# Patient Record
Sex: MALE | Race: WHITE | NOT HISPANIC OR LATINO | Employment: OTHER | URBAN - METROPOLITAN AREA
[De-identification: names, ages, dates, MRNs, and addresses within clinical notes are randomized per-mention and may not be internally consistent; named-entity substitution may affect disease eponyms.]

---

## 2020-07-05 ENCOUNTER — VIRTUAL VISIT (OUTPATIENT)
Dept: FAMILY MEDICINE | Facility: OTHER | Age: 45
End: 2020-07-05

## 2020-07-07 NOTE — PROGRESS NOTES
"Date: 2020 09:31:41  Clinician: Kali Lee  Clinician NPI: [id number]  Patient: Antonio Wang  Patient : 1975  Patient Address: [address] [place] [id number]  Patient Phone: [phone]  Visit Protocol: URI  Patient Summary:  Antonio is a 44 year old ( : 1975 ) male who initiated a Visit for COVID-19 (Coronavirus) evaluation and screening. When asked the question \"Please sign me up to receive news, health information and promotions. \", Antonio responded \"No\".    When asked when his symptoms started, Antonio reported that he does not have any symptoms.   He denies having recent facial or sinus surgery in the past 60 days and taking antibiotic medication in the past month.    Pertinent COVID-19 (Coronavirus) information  In the past 14 days, Antonio has not worked in a congregate living setting.   He does not work or volunteer as healthcare worker or a  and does not work or volunteer in a healthcare facility.   Antonio also has not lived in a congregate living setting in the past 14 days. He does not live with a healthcare worker.   Antonio has had a close contact with a laboratory-confirmed COVID-19 patient in the last 14 days. Additional information about contact with COVID-19 (Coronavirus) patient as reported by the patient (free text): I was exposed 20 at a grad party in [facility]   Pertinent medical history  Antonio does not need a return to work/school note.   Weight: 145 lbs   Antonio smokes or uses smokeless tobacco.   Weight: 145 lbs    MEDICATIONS: No current medications, ALLERGIES: NKDA  Clinician Response:  Dear Antonio,      You don't here much about [place], [place].  My parents grew up in [place], [place].        Based on your exposure to.COVID-19 (Coronavirus), we would like to test you for this virus.  1. Please call [phone] to schedule your visit. Explain that you were referred by [facility] to have a COVID-19 test. Be ready to share your [facility] visit " ID number.  The following will serve as your written order for this COVID Test, ordered by me, for the indication of suspected COVID [Z20.828]: The test will be ordered in [facility], our electronic health record, after you are scheduled. It will show as ordered and authorized by Gabino Fitch MD.  Order: COVID-19 (Coronavirus) PCR for ASYMPTOMATIC EXPOSURE testing from [facility].  If you know you have had close contact with someone who tested positive, you should be quarantined for 14 days after this exposure. You should stay in quarantine for the14 days even if the covid test is negative, the optimal time to test after exposure is 5-7 days from the exposure  Quarantine means   What should I do?  For safety, it's very important to follow these rules. Do this for 14 days after the date you were last exposed to the virus..  Stay home and away from others. Don't go to school or anywhere else. Generally quarantine means staying home for work but there are some exceptions to this. Please contact your workplace.   No hugging, kissing or shaking hands.  Don't let anyone visit.  Cover your mouth and nose with a mask, tissue or washcloth to avoid spreading germs.  Wash your hands and face often. Use soap and water.  What are the symptoms of COVID-19?  The most common symptoms are cough, fever and trouble breathing. Less common symptoms include headache, body aches, fatigue (feeling very tired), chills, sore throat, stuffy or runny nose, diarrhea (loose poop), loss of taste or smell, belly pain, and nausea or vomiting (feeling sick to your stomach or throwing up).  After 14 days, if you have still don't have symptoms, you likely don't have this virus.  If you develop symptoms, follow these guidelines.  If you're normally healthy: Please start another [facility] visit to report your symptoms. Go to [email or web address].  If you have a serious health problem (like cancer, heart failure, an organ transplant or kidney disease): Call your specialty  clinic. Let them know that you might have COVID-19.  2. When it's time for your COVID test:  Stay at least 6 feet away from others. (If someone will drive you to your test, stay in the backseat, as far away from the  as you can.)  Cover your mouth and nose with a mask, tissue or washcloth.  Go straight to the testing site. Don't make any stops on the way there or back.  Please note  Caregivers in these groups are at risk for severe illness due to COVID-19:  o People 65 years and older  o People who live in a nursing home or long-term care facility  o People with chronic disease (lung, heart, cancer, diabetes, kidney, liver, immunologic)  o People who have a weakened immune system, including those who:  Are in cancer treatment  Take medicine that weakens the immune system, such as corticosteroids  Had a bone marrow or organ transplant  Have an immune deficiency  Have poorly controlled HIV or AIDS  Are obese (body mass index of 40 or higher)  Smoke regularly  Where can I get more information?  [facility] -- About COVID-19: [email or web address]  CDC -- What to Do If You're Sick: www.cdc.gov/coronavirus/2019-ncov/about/steps-when-sick.html  [facility] -- Ending Home Isolation: www.cdc.gov/coronavirus/2019-ncov/hcp/disposition-in-home-patients.html  [facility] -- Caring for Someone: www.cdc.gov/coronavirus/2019-ncov/if-you-are-sick/care-for-someone.html  [facility] -- Interim Guidance for Hospital Discharge to Home: www.health.[facility].[place]/diseases/coronavirus/hcp/hospdischarge.pdf  [facility] clinical trials (COVID-19 research studies): clinicalaffairs.[facility]-clinical-trials  Below are the COVID-19 hotlines at the [place] [facility] ([facility]). Interpreters are available.  For health questions: Call 287-472-4607 or 1-660.355.7450 (7 a.m. to 7 p.m.)  For questions about schools and childcare: Call 410-328-7090 or 1-740.814.4808 (7 a.m. to 7 p.m.)    Diagnosis: Cough  Diagnosis ICD: R05

## 2023-07-06 ENCOUNTER — OFFICE VISIT (OUTPATIENT)
Dept: CHIROPRACTIC MEDICINE | Facility: OTHER | Age: 48
End: 2023-07-06
Attending: CHIROPRACTOR

## 2023-07-06 DIAGNOSIS — M99.03 SEGMENTAL AND SOMATIC DYSFUNCTION OF LUMBAR REGION: Primary | ICD-10-CM

## 2023-07-06 DIAGNOSIS — M54.50 ACUTE RIGHT-SIDED LOW BACK PAIN WITHOUT SCIATICA: ICD-10-CM

## 2023-07-06 DIAGNOSIS — M99.02 SEGMENTAL AND SOMATIC DYSFUNCTION OF THORACIC REGION: ICD-10-CM

## 2023-07-06 DIAGNOSIS — M99.01 SEGMENTAL AND SOMATIC DYSFUNCTION OF CERVICAL REGION: ICD-10-CM

## 2023-07-06 PROCEDURE — 98941 CHIROPRACT MANJ 3-4 REGIONS: CPT | Mod: AT | Performed by: CHIROPRACTOR

## 2023-07-06 NOTE — PROGRESS NOTES
Subjective Finding:    Chief compalint: Patient presents with:  Back Pain: Tightness in low back and neck region    , Pain Scale: 5/10, Intensity: sharp, Duration: 2 months, Radiating: no.    Date of injury:     Activities that the pain restricts:   Home/household/hobbies/social activities: Yes.  Work duties: Yes.  Sleep: Yes.  Makes symptoms better: rest.  Makes symptoms worse: activity.  Have you seen anyone else for the symptoms? No.  Work related: No.  Automobile related injury: No.    Objective and Assessment:    Posture Analysis:   High shoulder: .  Head tilt: .  High iliac crest: right.  Head carriage: neutral.  Thoracic Kyphosis: neutral.  Lumbar Lordosis: forward.    Lumbar Range of Motion: extension decreased and right lateral flexion decreased.  Cervical Range of Motion: extension decreased.  Thoracic Range of Motion: extension decreased.  Extremity Range of Motion: .    Palpation:   Quad lumb: bilateral, referred pain: no  Lev scapulae: sharp pain, referred pain: no    Segmental dysfunction pre-treatment and treatment area: C2, T6, T7, L4 and L5.    Assessment post-treatment:  Cervical: ROM increased.  Thoracic: ROM increased.  Lumbar: ROM increased.    Comments: .      Complicating Factors: .    Procedure(s):  CMT:  21604 Chiropractic manipulative treatment 3-4 regions performed   Cervical: Diversified, See above for level, Supine, Thoracic: Diversified, See above for level, Prone and Lumbar: Diversified, See above for level, Side posture    Modalities:  None performed this visit    Therapeutic procedures:  None    Plan:  Treatment plan: 2 times per week for 1 weeks.  Instructed patient: stretch as instructed at visit and walk 10 minutes.  Short term goals: increase ROM.  Long term goals: restore normal function.  Prognosis: excellent.

## 2023-12-13 ENCOUNTER — HOSPITAL ENCOUNTER (EMERGENCY)
Facility: HOSPITAL | Age: 48
Discharge: HOME | End: 2023-12-13
Attending: INTERNAL MEDICINE | Admitting: INTERNAL MEDICINE

## 2023-12-13 ENCOUNTER — APPOINTMENT (OUTPATIENT)
Dept: GENERAL RADIOLOGY | Facility: HOSPITAL | Age: 48
End: 2023-12-13
Attending: INTERNAL MEDICINE

## 2023-12-13 VITALS
DIASTOLIC BLOOD PRESSURE: 88 MMHG | SYSTOLIC BLOOD PRESSURE: 143 MMHG | OXYGEN SATURATION: 98 % | TEMPERATURE: 98.7 F | HEART RATE: 102 BPM | RESPIRATION RATE: 18 BRPM

## 2023-12-13 DIAGNOSIS — S61.012A: ICD-10-CM

## 2023-12-13 DIAGNOSIS — L08.9: ICD-10-CM

## 2023-12-13 PROCEDURE — 87070 CULTURE OTHR SPECIMN AEROBIC: CPT | Performed by: INTERNAL MEDICINE

## 2023-12-13 PROCEDURE — 99284 EMERGENCY DEPT VISIT MOD MDM: CPT | Performed by: INTERNAL MEDICINE

## 2023-12-13 PROCEDURE — 99284 EMERGENCY DEPT VISIT MOD MDM: CPT

## 2023-12-13 PROCEDURE — 73140 X-RAY EXAM OF FINGER(S): CPT | Mod: LT

## 2023-12-13 PROCEDURE — 87077 CULTURE AEROBIC IDENTIFY: CPT | Performed by: INTERNAL MEDICINE

## 2023-12-13 PROCEDURE — 250N000013 HC RX MED GY IP 250 OP 250 PS 637: Performed by: INTERNAL MEDICINE

## 2023-12-13 RX ORDER — CLINDAMYCIN HCL 150 MG
450 CAPSULE ORAL 3 TIMES DAILY
Qty: 63 CAPSULE | Refills: 0 | Status: SHIPPED | OUTPATIENT
Start: 2023-12-13 | End: 2023-12-20

## 2023-12-13 RX ORDER — CLINDAMYCIN HCL 150 MG
450 CAPSULE ORAL ONCE
Status: COMPLETED | OUTPATIENT
Start: 2023-12-13 | End: 2023-12-13

## 2023-12-13 RX ADMIN — CLINDAMYCIN HYDROCHLORIDE 450 MG: 150 CAPSULE ORAL at 20:21

## 2023-12-14 ASSESSMENT — ENCOUNTER SYMPTOMS
SHORTNESS OF BREATH: 0
COUGH: 0
FEVER: 0
ABDOMINAL PAIN: 0

## 2023-12-14 NOTE — ED NOTES
"Patient ambulatory to ED room 7. Patient states about 1 week ago he reached into his pocket and stuck left thumb with a fish hook. A few days after that, patient noted some swelling and discomfort to thumb. Patient states that he \"poked\" a hole into thumb to get \"relieve pressure\" and get some \"pus out\". Patient has open area with white surrounding (see photo).   "

## 2023-12-14 NOTE — ED PROVIDER NOTES
History     Chief Complaint   Patient presents with    Thumb Discomfort     The history is provided by the patient.   Hand Injury  Location:  Finger  Finger location:  L thumb  Injury: yes    Time since incident:  10 days  Pain details:     Quality:  Aching    Severity:  Mild    Onset quality:  Gradual    Duration:  10 days    Timing:  Constant    Progression:  Unchanged  Tetanus status:  Unknown  Associated symptoms: no fever        Allergies:  Allergies   Allergen Reactions    Amoxicillin      As a child        Problem List:    There are no problems to display for this patient.       Past Medical History:    No past medical history on file.    Past Surgical History:    No past surgical history on file.    Family History:    No family history on file.    Social History:  Marital Status:  Single [1]        Medications:    clindamycin (CLEOCIN) 150 MG capsule          Review of Systems   Constitutional:  Negative for fever.   Respiratory:  Negative for cough and shortness of breath.    Cardiovascular:  Negative for chest pain.   Gastrointestinal:  Negative for abdominal pain.   Skin:  Negative for rash.   All other systems reviewed and are negative.      Physical Exam   BP: 143/88  Pulse: 102  Temp: 98.7  F (37.1  C)  Resp: 18  SpO2: 98 %      Physical Exam  Constitutional:       General: He is not in acute distress.     Appearance: Normal appearance. He is not toxic-appearing.   HENT:      Head: Atraumatic.   Eyes:      General: No scleral icterus.     Conjunctiva/sclera: Conjunctivae normal.   Cardiovascular:      Rate and Rhythm: Normal rate.      Heart sounds: Normal heart sounds.   Pulmonary:      Effort: Pulmonary effort is normal. No respiratory distress.      Breath sounds: Normal breath sounds.   Abdominal:      Palpations: Abdomen is soft.      Tenderness: There is no abdominal tenderness.   Musculoskeletal:         General: No deformity.      Cervical back: Neck supple.      Comments: Left thumb :  "ventral aspect of distal phalanx open wound , swollen, pus is coming out with squeezing it  ROM of 1 MCP and PIP joint of left thumb is normal   Left hand : no swelling, no redness and no tenderness   Skin:     General: Skin is warm.   Neurological:      Mental Status: He is alert.         ED Course                 Procedures                Results for orders placed or performed during the hospital encounter of 12/13/23 (from the past 24 hour(s))   Fingers XR, 2-3 views, left    Narrative    PROCEDURE:  XR FINGER LEFT G/E 2 VIEWS    HISTORY: pain, swelling    COMPARISON: No relevant priors available for comparison    TECHNIQUE:  XR FINGER LEFT 2 VIEWS    FINDINGS:   No acute fracture or dislocation is identified. No suspicious osseous  lesion. The joint spaces are preserved. Ulnar subluxation of the third  distal phalanx.    No foreign body or subcutaneous emphysema.        Impression    IMPRESSION:   No acute osseous abnormality.    UNRULY SILVA MD         SYSTEM ID:  [id number]       Medications   clindamycin (CLEOCIN) capsule 450 mg (has no administration in time range)       Assessments & Plan (with Medical Decision Making)   Left thumb local infection with open wound  Wound culture sent   Clinamycin started  I advised to follow with hand clininc/ wound clinic   Return to ER  pain increasing, redness spreading hand , fever or any other unusual symptoms  He understood and agreed.     Pt refused tetnus shot , stated \" I do not believe in vaccination\"    I have reviewed the nursing notes.    I have reviewed the findings, diagnosis, plan and need for follow up with the patient.          New Prescriptions    CLINDAMYCIN (CLEOCIN) 150 MG CAPSULE    Take 3 capsules (450 mg) by mouth 3 times daily for 7 days       Final diagnoses:   Laceration of thumb with infection, left, initial encounter       12/13/2023   [facility]       Omar Viera MD  12/14/23 0526    "

## 2023-12-14 NOTE — ED NOTES
"Patient provided written and verbal discharge instructions and patient verbalizes understanding. Patient declined tdap to both this writer and Dr. Viera, stating \"I'm good on that\". Patient advised that prescription was e-scribed to [facility].   "

## 2023-12-14 NOTE — DISCHARGE INSTRUCTIONS
[facility]  [address] [address]  [phone]

## 2023-12-14 NOTE — ED TRIAGE NOTES
"\"Fish hook got stuck in my left thumb about 7-10 days ago.  Having pain in left thumb and hand.  Swelling present in left thumb.\"          "

## 2023-12-16 LAB
BACTERIA ABSC ANAEROBE+AEROBE CULT: ABNORMAL
GRAM STAIN RESULT: ABNORMAL
GRAM STAIN RESULT: ABNORMAL

## 2024-04-19 ENCOUNTER — HOSPITAL ENCOUNTER (EMERGENCY)
Facility: HOSPITAL | Age: 49
Discharge: LEFT BEFORE DISCHARGE | End: 2024-04-19

## 2024-04-19 VITALS
SYSTOLIC BLOOD PRESSURE: 155 MMHG | TEMPERATURE: 97.1 F | HEART RATE: 87 BPM | RESPIRATION RATE: 16 BRPM | WEIGHT: 135.69 LBS | DIASTOLIC BLOOD PRESSURE: 104 MMHG | OXYGEN SATURATION: 99 %

## 2024-04-19 ASSESSMENT — COLUMBIA-SUICIDE SEVERITY RATING SCALE - C-SSRS
1. IN THE PAST MONTH, HAVE YOU WISHED YOU WERE DEAD OR WISHED YOU COULD GO TO SLEEP AND NOT WAKE UP?: NO
6. HAVE YOU EVER DONE ANYTHING, STARTED TO DO ANYTHING, OR PREPARED TO DO ANYTHING TO END YOUR LIFE?: NO
2. HAVE YOU ACTUALLY HAD ANY THOUGHTS OF KILLING YOURSELF IN THE PAST MONTH?: NO

## 2024-04-19 NOTE — ED TRIAGE NOTES
Admitting desk reports patient informed them he wanted to be seen in urgent care.     Triage Assessment (Adult)       Row Name 04/19/24 1548          Triage Assessment    [facility] WDL

## 2024-04-19 NOTE — ED TRIAGE NOTES
"Reports last weekend he noted right lateral leg pain and noticed a red area. Since it has worsened and become swollen and has a black scab on the center. He did not see the spider but \"spent all day researching it and I could bet that is what it is\". Denies fever/ chills, N/V/D.         "

## 2024-04-20 ENCOUNTER — HOSPITAL ENCOUNTER (EMERGENCY)
Facility: HOSPITAL | Age: 49
Discharge: HOME | End: 2024-04-20
Attending: NURSE PRACTITIONER | Admitting: NURSE PRACTITIONER

## 2024-04-20 VITALS
DIASTOLIC BLOOD PRESSURE: 83 MMHG | RESPIRATION RATE: 19 BRPM | HEART RATE: 72 BPM | TEMPERATURE: 97.6 F | SYSTOLIC BLOOD PRESSURE: 128 MMHG | OXYGEN SATURATION: 100 %

## 2024-04-20 DIAGNOSIS — L08.9 INFECTED WOUND: ICD-10-CM

## 2024-04-20 DIAGNOSIS — T14.8XXA INFECTED WOUND: ICD-10-CM

## 2024-04-20 PROCEDURE — 10060 I&D ABSCESS SIMPLE/SINGLE: CPT

## 2024-04-20 PROCEDURE — 87070 CULTURE OTHR SPECIMN AEROBIC: CPT | Performed by: NURSE PRACTITIONER

## 2024-04-20 PROCEDURE — 999N000104 HC STATISTIC NO CHARGE

## 2024-04-20 PROCEDURE — 250N000009 HC RX 250: Performed by: NURSE PRACTITIONER

## 2024-04-20 PROCEDURE — 10060 I&D ABSCESS SIMPLE/SINGLE: CPT | Performed by: NURSE PRACTITIONER

## 2024-04-20 RX ORDER — SULFAMETHOXAZOLE/TRIMETHOPRIM 800-160 MG
1 TABLET ORAL 2 TIMES DAILY
Qty: 14 TABLET | Refills: 0 | Status: SHIPPED | OUTPATIENT
Start: 2024-04-20 | End: 2024-04-27

## 2024-04-20 RX ADMIN — LIDOCAINE HYDROCHLORIDE AND EPINEPHRINE 4 ML: 20; 10 INJECTION, SOLUTION INFILTRATION; PERINEURAL at 13:53

## 2024-04-20 ASSESSMENT — ENCOUNTER SYMPTOMS
CHILLS: 0
COLOR CHANGE: 1
FEVER: 0
NAUSEA: 0
NUMBNESS: 0
ACTIVITY CHANGE: 1
VOMITING: 0
WOUND: 1

## 2024-04-20 ASSESSMENT — COLUMBIA-SUICIDE SEVERITY RATING SCALE - C-SSRS
2. HAVE YOU ACTUALLY HAD ANY THOUGHTS OF KILLING YOURSELF IN THE PAST MONTH?: NO
1. IN THE PAST MONTH, HAVE YOU WISHED YOU WERE DEAD OR WISHED YOU COULD GO TO SLEEP AND NOT WAKE UP?: NO
6. HAVE YOU EVER DONE ANYTHING, STARTED TO DO ANYTHING, OR PREPARED TO DO ANYTHING TO END YOUR LIFE?: NO

## 2024-04-20 ASSESSMENT — ACTIVITIES OF DAILY LIVING (ADL): ADLS_ACUITY_SCORE: 35

## 2024-04-20 NOTE — ED TRIAGE NOTES
Pt presents with c/o insect bite    State that he thinks he was bite by  spider.    Right leg, next to his knee.   Noticed a week ago.   Has tried squeezing it     Black middle, can see two spots of white under the skin also

## 2024-04-20 NOTE — ED PROVIDER NOTES
History     Chief Complaint   Patient presents with    Wound Check     HPI  Antonio Wang is a 48 year old male who presents what he believes is a recluse spider bite on his right lateral knee that started 7 to 9 days ago.  The wound has progressively worsened to the point it has  a black center, is swollen, and surrounded by redness.  Has a second bite area right, medial, lower leg. No OTC medications have been taken or home interventions applied.  Smoker.  Denies fevers, chills, nausea, vomiting, and numbness and tingling in his right leg.      Allergies:  Allergies   Allergen Reactions    Amoxicillin      As a child        Problem List:    There are no problems to display for this patient.       Past Medical History:    History reviewed. No pertinent past medical history.    Past Surgical History:    History reviewed. No pertinent surgical history.    Family History:    History reviewed. No pertinent family history.    Social History:  Marital Status:  Single [1]        Medications:    sulfamethoxazole-trimethoprim (BACTRIM DS) 800-160 MG tablet          Review of Systems   Constitutional:  Positive for activity change. Negative for chills and fever.   Gastrointestinal:  Negative for nausea and vomiting.   Skin:  Positive for color change (redness right lateral knee) and wound (right lateral knee).   Neurological:  Negative for numbness.       Physical Exam   BP: 128/83  Pulse: 72  Temp: 97.6  F (36.4  C)  Resp: 19  SpO2: 100 %      Physical Exam  Vitals and nursing note reviewed.   Constitutional:       General: He is in acute distress (mild).      Appearance: He is underweight.   Cardiovascular:      Rate and Rhythm: Normal rate.   Pulmonary:      Effort: Pulmonary effort is normal.   Musculoskeletal:         General: Swelling and tenderness present.        Legs:    Skin:     General: Skin is warm and dry.      Capillary Refill: Capillary refill takes less than 2 seconds.      Findings: Erythema present. No  bruising.   Neurological:      Mental Status: He is alert and oriented to person, place, and time.   Psychiatric:         Behavior: Behavior normal.         ED Course        [facility]    PROCEDURE: -Incision/Drainage    Date/Time: 4/20/2024 2:22 PM    Performed by: Bhavana Davila CNP  Authorized by: Bhavana Davila CNP    Risks, benefits and alternatives discussed.      LOCATION:      Type:  Abscess    Size:  1 cm    Location:  Lower extremity    Lower extremity location:  Knee    Knee location:  R knee    PRE-PROCEDURE DETAILS:     Skin preparation:  Chloraprep    PROCEDURE TYPE:     Complexity:  Simple    ANESTHESIA (see MAR for exact dosages):     Anesthesia method:  Local infiltration    Local anesthetic:  Lidocaine 2% WITH epi (5 ml)    PROCEDURE DETAILS:     Needle aspiration: no      Incision types:  Stab incision    Incision depth:  Dermal    Scalpel blade:  11    Wound management:  Debrided and irrigated with saline    Drainage:  Bloody and purulent    Drainage amount:  Scant    Wound treatment:  Wound left open    Packing materials:  None    PROCEDURE  Describe Procedure: Consent obtained. Time out completed. Right knee cleaned with chloraprep. 2 mm eschar tissue removed along with small amount of slough debrided and removed with #11 blade from 1 cm infected wound. Wound Culture obtained. Irrigated with 20 ml NS. Triple antibiotic and dry dressing placed.         Patient Tolerance:  Patient tolerated the procedure well with no immediate complications                    No results found for this or any previous visit (from the past 24 hour(s)).    Medications   lidocaine 2%-EPINEPHrine 1:100,000 injection 4 mL (4 mLs Other $Given 4/20/24 1353)       Assessments & Plan (with Medical Decision Making)     I have reviewed the nursing notes.    I have reviewed the findings, diagnosis, plan and need for follow up with the patient.  (T14.8XXA,  L08.9) Infected wound  Comment: 48 year  old male who presents what he believes is a recluse spider bite on his right lateral knee that started 7 to 9 days ago.  The wound has progressively worsened to the point it has  a black center, is swollen, and surrounded by redness.  Has a second bite area right, medial, lower leg. No OTC medications have been taken or home interventions applied.  Smoker.  Denies fevers, chills, nausea, vomiting, and numbness and tingling in his right leg.      MDM: 1 cm eschar tissue surrounded by 1.2 cm region of erythema. Very tender to touch Very tender to touch. See media  See procedure note    Triple antibiotic and dressing applied per LPN    Plan: Apply bacitracin and do twice daily dressing changes for the next 48 to 72 hours. You may shower but do not saturate the wound. If you have increased pain, redness at wound site, fevers, or abnormal drainage (purulent/pus) you need to see your primary care provider or return to Urgent Care/ER immediately. Acetaminophen/tylenol  or ibuprofen for pain. Complete all antibiotics even if feeling better.  Take antibiotics with food unless instructed otherwise. Yogurt or probiotics may help decrease stomach upset and diarrhea.    These discharge instructions and medications were reviewed with him and understanding verbalized.    This document was prepared using a combination of typing and voice generated software.  While every attempt was made for accuracy, spelling and grammatical errors may exist.    Discharge Medication List as of 4/20/2024  2:17 PM        START taking these medications    Details   sulfamethoxazole-trimethoprim (BACTRIM DS) 800-160 MG tablet Take 1 tablet by mouth 2 times daily for 7 days, Disp-14 tablet, R-0, E-Prescribe             Final diagnoses:   Infected wound       4/20/2024   [facility]         Bhavana Davila, CNP  04/20/24 4712

## 2024-04-20 NOTE — DISCHARGE INSTRUCTIONS
Apply bacitracin and do twice daily dressing changes for the next 48 to 72 hours. You may shower but do not saturate the wound. If you have increased pain, redness at wound site, fevers, or abnormal drainage (purulent/pus) you need to see your primary care provider or return to Urgent Care/ER immediately. Acetaminophen/tylenol  or ibuprofen for pain. Complete all antibiotics even if feeling better.  Take antibiotics with food unless instructed otherwise. Yogurt or probiotics may help decrease stomach upset and diarrhea.

## 2024-04-22 LAB
BACTERIA ABSC ANAEROBE+AEROBE CULT: ABNORMAL
GRAM STAIN RESULT: ABNORMAL

## 2024-11-20 ENCOUNTER — APPOINTMENT (OUTPATIENT)
Dept: GENERAL RADIOLOGY | Facility: HOSPITAL | Age: 49
End: 2024-11-20
Attending: NURSE PRACTITIONER

## 2024-11-20 ENCOUNTER — HOSPITAL ENCOUNTER (EMERGENCY)
Facility: HOSPITAL | Age: 49
Discharge: HOME | End: 2024-11-20
Attending: NURSE PRACTITIONER

## 2024-11-20 VITALS
TEMPERATURE: 98.5 F | OXYGEN SATURATION: 97 % | RESPIRATION RATE: 20 BRPM | DIASTOLIC BLOOD PRESSURE: 89 MMHG | HEIGHT: 69 IN | SYSTOLIC BLOOD PRESSURE: 143 MMHG | BODY MASS INDEX: 21.48 KG/M2 | WEIGHT: 145 LBS | HEART RATE: 105 BPM

## 2024-11-20 DIAGNOSIS — T14.8XXA OPEN WOUND: ICD-10-CM

## 2024-11-20 DIAGNOSIS — M70.52 INFRAPATELLAR BURSITIS OF LEFT KNEE: Primary | ICD-10-CM

## 2024-11-20 DIAGNOSIS — L03.116 CELLULITIS OF LEFT LOWER EXTREMITY: ICD-10-CM

## 2024-11-20 LAB
BASOPHILS # BLD AUTO: 0 10E3/UL (ref 0–0.2)
BASOPHILS NFR BLD AUTO: 0 %
CRP SERPL-MCNC: 132.02 MG/L
EOSINOPHIL # BLD AUTO: 0.1 10E3/UL (ref 0–0.7)
EOSINOPHIL NFR BLD AUTO: 1 %
ERYTHROCYTE [DISTWIDTH] IN BLOOD BY AUTOMATED COUNT: 14 % (ref 10–15)
ERYTHROCYTE [SEDIMENTATION RATE] IN BLOOD BY WESTERGREN METHOD: 17 MM/HR (ref 0–15)
HCT VFR BLD AUTO: 42.8 % (ref 40–53)
HGB BLD-MCNC: 14.6 G/DL (ref 13.3–17.7)
HOLD SPECIMEN: NORMAL
IMM GRANULOCYTES # BLD: 0.1 10E3/UL
IMM GRANULOCYTES NFR BLD: 0 %
LYMPHOCYTES # BLD AUTO: 1.9 10E3/UL (ref 0.8–5.3)
LYMPHOCYTES NFR BLD AUTO: 13 %
MCH RBC QN AUTO: 28.3 PG (ref 26.5–33)
MCHC RBC AUTO-ENTMCNC: 34.1 G/DL (ref 31.5–36.5)
MCV RBC AUTO: 83 FL (ref 78–100)
MONOCYTES # BLD AUTO: 1.8 10E3/UL (ref 0–1.3)
MONOCYTES NFR BLD AUTO: 12 %
NEUTROPHILS # BLD AUTO: 11.2 10E3/UL (ref 1.6–8.3)
NEUTROPHILS NFR BLD AUTO: 74 %
NRBC # BLD AUTO: 0 10E3/UL
NRBC BLD AUTO-RTO: 0 /100
PLAT MORPH BLD: NORMAL
PLATELET # BLD AUTO: 449 10E3/UL (ref 150–450)
RBC # BLD AUTO: 5.16 10E6/UL (ref 4.4–5.9)
RBC MORPH BLD: NORMAL
WBC # BLD AUTO: 15.1 10E3/UL (ref 4–11)

## 2024-11-20 PROCEDURE — 87205 SMEAR GRAM STAIN: CPT | Performed by: NURSE PRACTITIONER

## 2024-11-20 PROCEDURE — 73562 X-RAY EXAM OF KNEE 3: CPT | Mod: LT

## 2024-11-20 PROCEDURE — 86140 C-REACTIVE PROTEIN: CPT | Performed by: NURSE PRACTITIONER

## 2024-11-20 PROCEDURE — G0463 HOSPITAL OUTPT CLINIC VISIT: HCPCS

## 2024-11-20 PROCEDURE — 36415 COLL VENOUS BLD VENIPUNCTURE: CPT | Performed by: NURSE PRACTITIONER

## 2024-11-20 PROCEDURE — 87070 CULTURE OTHR SPECIMN AEROBIC: CPT | Performed by: NURSE PRACTITIONER

## 2024-11-20 PROCEDURE — 85025 COMPLETE CBC W/AUTO DIFF WBC: CPT | Performed by: NURSE PRACTITIONER

## 2024-11-20 PROCEDURE — 99214 OFFICE O/P EST MOD 30 MIN: CPT | Performed by: NURSE PRACTITIONER

## 2024-11-20 PROCEDURE — 85652 RBC SED RATE AUTOMATED: CPT | Performed by: NURSE PRACTITIONER

## 2024-11-20 PROCEDURE — 87186 SC STD MICRODIL/AGAR DIL: CPT | Performed by: NURSE PRACTITIONER

## 2024-11-20 RX ORDER — CLINDAMYCIN HYDROCHLORIDE 150 MG/1
450 CAPSULE ORAL 3 TIMES DAILY
Qty: 63 CAPSULE | Refills: 0 | Status: SHIPPED | OUTPATIENT
Start: 2024-11-20 | End: 2024-11-27

## 2024-11-20 ASSESSMENT — ENCOUNTER SYMPTOMS
JOINT SWELLING: 1
MYALGIAS: 1
FEVER: 0
CHILLS: 0
WOUND: 1
COLOR CHANGE: 1

## 2024-11-20 ASSESSMENT — ACTIVITIES OF DAILY LIVING (ADL)
ADLS_ACUITY_SCORE: 0
ADLS_ACUITY_SCORE: 0

## 2024-11-20 NOTE — ED PROVIDER NOTES
"  History     Chief Complaint   Patient presents with     Wound Check     Leg Pain     HPI  Antonio Wang is a 49 year old male who presents to urgent care with concerns of infection to his left knee.  He has had pain to his knee for about 5 days.  This is accompanied by swelling, redness and a wound.  He tells me that the wound initially started off as a little pimple that he eventually popped and noted some white discharge from it.  He notes that every time that he does squeeze this area he tends to get a discharge out of it.  Pain is going down his left leg.  Reports that he does kneel down at work several times and that may have started the pimple.  No fever or chills.  He is having a hard time standing for a long time due to the pain to his knee.  Reports some decreased range of motion.    Allergies:  Allergies   Allergen Reactions     Amoxicillin      As a child        Problem List:    There are no active problems to display for this patient.       Past Medical History:    History reviewed. No pertinent past medical history.    Past Surgical History:    History reviewed. No pertinent surgical history.    Family History:    History reviewed. No pertinent family history.    Social History:  Marital Status:  Single [1]        Medications:    clindamycin (CLEOCIN) 150 MG capsule          Review of Systems   Constitutional:  Negative for chills and fever.   Musculoskeletal:  Positive for gait problem, joint swelling and myalgias.   Skin:  Positive for color change and wound.   All other systems reviewed and are negative.      Physical Exam   BP: 143/89  Pulse: 105  Temp: 98.5  F (36.9  C)  Resp: 20  Height: 175.3 cm (5' 9\")  Weight: 65.8 kg (145 lb)  SpO2: 97 %      Physical Exam  Vitals and nursing note reviewed.   Constitutional:       Appearance: Normal appearance. He is not ill-appearing or toxic-appearing.   HENT:      Head: Atraumatic.   Eyes:      Pupils: Pupils are equal, round, and reactive to light. "   Cardiovascular:      Rate and Rhythm: Normal rate.   Pulmonary:      Effort: Pulmonary effort is normal. No respiratory distress.   Musculoskeletal:         General: Swelling and tenderness present. No deformity.      Cervical back: Neck supple.      Right lower leg: No edema.      Left lower leg: No edema.      Comments: Redness and warmth to medial left knee.  Swelling appreciated to entire left knee joint.  Superficial open wound measuring 1 cm x 0.5 cm inferior to knee.  No active drainage to this area.  Tenderness to anterior left knee joint.  Knee extension about 180 degrees.  He can flex his knee to about 45 degrees but does note increasing pain with this.   Skin:     General: Skin is warm and dry.      Capillary Refill: Capillary refill takes less than 2 seconds.      Findings: Erythema present.   Neurological:      Mental Status: He is alert and oriented to person, place, and time.         ED Course     ED Course as of 11/20/24 1508   Wed Nov 20, 2024   1105 Consult to orthopedic surgeon, Dr. Levy.  He recommends knee aspiration and collecting for cultures and crystals of the knee.  Patient should be started on antibiotics with recommendation for clindamycin however if is appropriate.  Dr. Levy is also concerned about gout.     Procedures            Results for orders placed or performed during the hospital encounter of 11/20/24 (from the past 24 hours)   XR Knee Left 3 Views    Narrative    Exam: XR KNEE LEFT 3 VIEWS     History:Male, age 49 years, pain and swelling to knee; superficial  open wound superior to knee cap; erythema to medial knee    Comparison:  No relevant prior imaging.    Technique: Three views are submitted.    Findings: Bones are normally mineralized. No evidence of acute or  subacute fracture.  No evidence of dislocation.           Impression    Impression:  No evidence of acute or subacute bony abnormality.     Localized swelling in the anterior aspect of the knee near the  tibial  tubercle, suggesting the possibility of superficial infrapatellar  bursitis. MRI would better characterize.    KEN MASTERS MD         SYSTEM ID:  [id number]   CBC with platelets differential    Narrative    The following orders were created for panel order CBC with platelets differential.  Procedure                               Abnormality         Status                     ---------                               -----------         ------                     CBC with platelets and d...[[id number]]  Abnormal            Final result               RBC and Platelet Morphology[[id number]]                      Final result                 Please view results for these tests on the individual orders.   CRP inflammation   Result Value Ref Range    CRP Inflammation 132.02 (H) <5.00 mg/L   Erythrocyte sedimentation rate auto   Result Value Ref Range    Erythrocyte Sedimentation Rate 17 (H) 0 - 15 mm/hr   CBC with platelets and differential   Result Value Ref Range    WBC Count 15.1 (H) 4.0 - 11.0 10e3/uL    RBC Count 5.16 4.40 - 5.90 10e6/uL    Hemoglobin 14.6 13.3 - 17.7 g/dL    Hematocrit 42.8 40.0 - 53.0 %    MCV 83 78 - 100 fL    MCH 28.3 26.5 - 33.0 pg    MCHC 34.1 31.5 - 36.5 g/dL    RDW 14.0 10.0 - 15.0 %    Platelet Count 449 150 - 450 10e3/uL    % Neutrophils 74 %    % Lymphocytes 13 %    % Monocytes 12 %    % Eosinophils 1 %    % Basophils 0 %    % Immature Granulocytes 0 %    NRBCs per 100 WBC 0 <1 /100    Absolute Neutrophils 11.2 (H) 1.6 - 8.3 10e3/uL    Absolute Lymphocytes 1.9 0.8 - 5.3 10e3/uL    Absolute Monocytes 1.8 (H) 0.0 - 1.3 10e3/uL    Absolute Eosinophils 0.1 0.0 - 0.7 10e3/uL    Absolute Basophils 0.0 0.0 - 0.2 10e3/uL    Absolute Immature Granulocytes 0.1 <=0.4 10e3/uL    Absolute NRBCs 0.0 10e3/uL   Extra Tube    Narrative    The following orders were created for panel order Extra Tube.  Procedure                               Abnormality         Status                     ---------                                -----------         ------                     Extra Heparinized Syringe[[id number]]                        Final result                 Please view results for these tests on the individual orders.   Extra Heparinized Syringe   Result Value Ref Range    Hold Specimen JI    RBC and Platelet Morphology   Result Value Ref Range    RBC Morphology Confirmed RBC Indices     Platelet Assessment  Automated Count Confirmed. Platelet morphology is normal.     Automated Count Confirmed. Platelet morphology is normal.       Medications - No data to display    Assessments & Plan (with Medical Decision Making)  This is a 49-year-old male that presented for evaluation of redness, swelling and pain to his left knee along with a superficial open wound to the anterior knee.  Noted to still have  good range of motion to his knee.  Redness appreciated to the medial left knee with some redness going below the knee. Does feel warm to the touch.  He is afebrile.  His distal pulses are intact.  No fluctuance appreciated.  No active drainage from the superficial open wound.  Patient reports some difficulty with ambulation and standing up.  However, he was noted to walk to the radiology department independently with minimal difficulty.  Lab findings show leukocytosis with a left shift as well as elevated inflammatory markers.  X-ray of his knee shows findings suggestive of infrapatellar bursitis.  Consulted orthopedic surgeon and appreciate his recommendations.  I discussed recommendations with patient at length.  The patient is adamant that he does not want to get his knee aspirated today.  He feels that once he is on antibiotics his symptoms will improve.  At this time he appears to be moving his knee much better than on my initial evaluation.  I spent about 10 minutes discussing risks and benefits of aspiration of his knee with this patient and all questions were answered.  Patient verbalized understanding.  He  has capacity to make decisions.  He continues to decline aspiration of his knee but would like to follow-up with orthopedic surgeon.  Patient will be treated with clindamycin for cellulitis as well as local wound infection.  Recommended he continue with Tylenol, ibuprofen, applying ice packs as well as elevating affected extremity.  Referral placed to orthopedics for follow-up.  Strict return precautions discussed with them at length and he verbalized understanding.     I have reviewed the nursing notes.    I have reviewed the findings, diagnosis, plan and need for follow up with the patient.  This document was prepared using a combination of typing and voice generated software.  While every attempt was made for accuracy, spelling and grammatical errors may exist.         Medical Decision Making  The patient's presentation was of low complexity (an acute and uncomplicated illness or injury).    The patient's evaluation involved:  ordering and/or review of 3+ test(s) in this encounter (see separate area of note for details)  discussion of management or test interpretation with another health professional (see separate area of note for details)    The patient's management necessitated moderate risk (prescription drug management including medications given in the ED).        Discharge Medication List as of 11/20/2024 11:27 AM        START taking these medications    Details   clindamycin (CLEOCIN) 150 MG capsule Take 3 capsules (450 mg) by mouth 3 times daily for 7 days., Disp-63 capsule, R-0, E-Prescribe             Final diagnoses:   Infrapatellar bursitis of left knee   Cellulitis of left lower extremity   Open wound       11/20/2024   [facility]       Mpofu, Prudence, CNP  11/20/24 1451

## 2024-11-20 NOTE — ED TRIAGE NOTES
"Pt presents with concerns of left knee pain. Pt reports kneeling down at work several time when pain started approx 5 days ago. Pt states \"I popped it out and squeezed it \" reports swelling down leg stating aftewards.         "

## 2024-11-20 NOTE — DISCHARGE INSTRUCTIONS
Take the antibiotic as prescribed until finished.  Keep the wound clean and dry.    Schedule an appointment with orthopedics for follow-up.    Please return to urgent care/emergency department for any worsening or concerning symptoms not limited to increased swelling, redness, high fevers.

## 2024-11-21 LAB
BACTERIA WND CULT: ABNORMAL
GRAM STAIN RESULT: ABNORMAL

## 2024-11-22 LAB
BACTERIA WND CULT: ABNORMAL
GRAM STAIN RESULT: ABNORMAL